# Patient Record
(demographics unavailable — no encounter records)

---

## 2025-06-18 NOTE — CONSULT LETTER
[Courtesy Letter:] : I had the pleasure of seeing your patient, [unfilled], in my office today. [Please see my note below.] : Please see my note below. [Consult Closing:] : Thank you very much for allowing me to participate in the care of this patient.  If you have any questions, please do not hesitate to contact me. [Sincerely,] : Sincerely, [Dear  ___] : Dear ~CATALINA, [FreeTextEntry3] : Stan Dillon MD.

## 2025-06-18 NOTE — ASSESSMENT
[FreeTextEntry1] : This is a 32-year-old woman who is 32 weeks pregnant. She has a long history of headaches. The description is suggestive of migraine that has been evolving into a more chronic daily pattern. The family history of brain tumor is concerning, and I would therefore obtain an MRI of the brain without contrast.  I have explained that there are essentially 2 strategies for dealing with chronic headaches.  The first is abortive medication of which there are numerous over-the-counter preparations as well as prescription options.  The second strategy is prophylactic medications.  I have explained that these are medications which prevent headaches when taken on a regular basis.  I have explained that there are several medications which have been found to be preventative against headaches.  I have further explained that none of the medications have an immediate effect.  They must build up in the system over a few weeks.  Most people notice that within a few weeks on the medication, the headache intensity is diminishing.  Within a few more weeks most people begin to notice that the frequency is decreasing as well.  I have explained that the preventive medications were all originally used for other conditions but were also found to work against chronic headaches.  The patient seemed to understand my explanation.  I have explained that once she delivers, we can consider preventive medication.  I will see her back in a few months.

## 2025-06-18 NOTE — HISTORY OF PRESENT ILLNESS
[FreeTextEntry1] : I saw this patient in the office today.  She describes headaches for many years. She is now 32 weeks pregnant. She reports that since the start of her pregnancy the headaches have been worse. They are daily.  They wax and wane in intensity. When severe they are associated with nausea and photophobia.